# Patient Record
Sex: FEMALE | Race: WHITE | Employment: FULL TIME | URBAN - NONMETROPOLITAN AREA
[De-identification: names, ages, dates, MRNs, and addresses within clinical notes are randomized per-mention and may not be internally consistent; named-entity substitution may affect disease eponyms.]

---

## 2017-02-13 ENCOUNTER — OFFICE VISIT (OUTPATIENT)
Dept: FAMILY MEDICINE CLINIC | Age: 27
End: 2017-02-13

## 2017-02-13 VITALS
DIASTOLIC BLOOD PRESSURE: 60 MMHG | HEIGHT: 62 IN | WEIGHT: 111 LBS | SYSTOLIC BLOOD PRESSURE: 108 MMHG | BODY MASS INDEX: 20.43 KG/M2 | OXYGEN SATURATION: 97 % | HEART RATE: 80 BPM

## 2017-02-13 DIAGNOSIS — Z00.00 ROUTINE MEDICAL EXAM: Primary | ICD-10-CM

## 2017-02-13 DIAGNOSIS — L30.9 DERMATITIS: ICD-10-CM

## 2017-02-13 PROCEDURE — 99395 PREV VISIT EST AGE 18-39: CPT | Performed by: FAMILY MEDICINE

## 2017-02-13 ASSESSMENT — ENCOUNTER SYMPTOMS
DIARRHEA: 0
BLOOD IN STOOL: 0
WHEEZING: 0
CONSTIPATION: 1
VOMITING: 0
SHORTNESS OF BREATH: 0
NAUSEA: 0

## 2017-09-12 ENCOUNTER — OFFICE VISIT (OUTPATIENT)
Dept: PRIMARY CARE CLINIC | Age: 27
End: 2017-09-12
Payer: COMMERCIAL

## 2017-09-12 VITALS
DIASTOLIC BLOOD PRESSURE: 74 MMHG | BODY MASS INDEX: 21.65 KG/M2 | TEMPERATURE: 98.3 F | HEIGHT: 63 IN | SYSTOLIC BLOOD PRESSURE: 112 MMHG | HEART RATE: 74 BPM | WEIGHT: 122.2 LBS | OXYGEN SATURATION: 98 %

## 2017-09-12 DIAGNOSIS — J01.01 ACUTE RECURRENT MAXILLARY SINUSITIS: Primary | ICD-10-CM

## 2017-09-12 PROCEDURE — 99213 OFFICE O/P EST LOW 20 MIN: CPT | Performed by: FAMILY MEDICINE

## 2017-09-12 RX ORDER — AMOXICILLIN AND CLAVULANATE POTASSIUM 875; 125 MG/1; MG/1
1 TABLET, FILM COATED ORAL 2 TIMES DAILY
Qty: 20 TABLET | Refills: 0 | Status: SHIPPED | OUTPATIENT
Start: 2017-09-12 | End: 2017-09-22

## 2017-09-12 ASSESSMENT — ENCOUNTER SYMPTOMS
SORE THROAT: 0
DIARRHEA: 0
WHEEZING: 0
NAUSEA: 0
SINUS PRESSURE: 1
VOMITING: 0
RHINORRHEA: 1
SHORTNESS OF BREATH: 0
SINUS COMPLAINT: 1
COUGH: 0

## 2017-09-13 ENCOUNTER — PATIENT MESSAGE (OUTPATIENT)
Dept: PRIMARY CARE CLINIC | Age: 27
End: 2017-09-13

## 2017-09-13 DIAGNOSIS — J01.01 ACUTE RECURRENT MAXILLARY SINUSITIS: ICD-10-CM

## 2017-09-13 DIAGNOSIS — J01.01 ACUTE RECURRENT MAXILLARY SINUSITIS: Primary | ICD-10-CM

## 2017-09-13 RX ORDER — AMOXICILLIN AND CLAVULANATE POTASSIUM 875; 125 MG/1; MG/1
1 TABLET, FILM COATED ORAL 2 TIMES DAILY
Qty: 20 TABLET | Refills: 0 | Status: CANCELLED | OUTPATIENT
Start: 2017-09-13 | End: 2017-09-23

## 2017-09-13 RX ORDER — PREDNISONE 20 MG/1
40 TABLET ORAL DAILY
Qty: 10 TABLET | Refills: 0 | Status: SHIPPED | OUTPATIENT
Start: 2017-09-13 | End: 2017-09-18

## 2018-02-19 ENCOUNTER — OFFICE VISIT (OUTPATIENT)
Dept: FAMILY MEDICINE CLINIC | Age: 28
End: 2018-02-19
Payer: COMMERCIAL

## 2018-02-19 VITALS
WEIGHT: 127 LBS | BODY MASS INDEX: 22.5 KG/M2 | HEART RATE: 80 BPM | SYSTOLIC BLOOD PRESSURE: 100 MMHG | HEIGHT: 63 IN | OXYGEN SATURATION: 98 % | DIASTOLIC BLOOD PRESSURE: 60 MMHG

## 2018-02-19 DIAGNOSIS — Z00.00 ROUTINE MEDICAL EXAM: Primary | ICD-10-CM

## 2018-02-19 DIAGNOSIS — L30.9 DERMATITIS: ICD-10-CM

## 2018-02-19 PROCEDURE — 99395 PREV VISIT EST AGE 18-39: CPT | Performed by: FAMILY MEDICINE

## 2018-02-19 RX ORDER — MECLIZINE HYDROCHLORIDE 25 MG/1
25 TABLET ORAL 3 TIMES DAILY PRN
COMMUNITY

## 2018-02-19 RX ORDER — ACETAMINOPHEN 500 MG
500 TABLET ORAL EVERY 6 HOURS PRN
COMMUNITY
End: 2018-02-19 | Stop reason: ALTCHOICE

## 2018-02-19 ASSESSMENT — PATIENT HEALTH QUESTIONNAIRE - PHQ9
SUM OF ALL RESPONSES TO PHQ9 QUESTIONS 1 & 2: 0
SUM OF ALL RESPONSES TO PHQ QUESTIONS 1-9: 0
1. LITTLE INTEREST OR PLEASURE IN DOING THINGS: 0
2. FEELING DOWN, DEPRESSED OR HOPELESS: 0

## 2018-02-19 ASSESSMENT — ENCOUNTER SYMPTOMS: NAUSEA: 1

## 2018-07-09 ENCOUNTER — HOSPITAL ENCOUNTER (OUTPATIENT)
Age: 28
Setting detail: SPECIMEN
Discharge: HOME | End: 2018-07-09
Payer: COMMERCIAL

## 2018-07-09 DIAGNOSIS — R30.0 DYSURIA: Primary | ICD-10-CM

## 2018-07-09 DIAGNOSIS — R30.0 DYSURIA: ICD-10-CM

## 2018-07-09 LAB
-: ABNORMAL
AMORPHOUS: ABNORMAL
BACTERIA: ABNORMAL
BILIRUBIN URINE: NEGATIVE
CASTS UA: ABNORMAL /LPF (ref 0–2)
COLOR: ABNORMAL
COMMENT UA: ABNORMAL
CRYSTALS, UA: ABNORMAL /HPF
EPITHELIAL CELLS UA: ABNORMAL /HPF (ref 0–5)
GLUCOSE URINE: NEGATIVE
KETONES, URINE: NEGATIVE
LEUKOCYTE ESTERASE, URINE: NEGATIVE
MUCUS: ABNORMAL
NITRITE, URINE: NEGATIVE
OTHER OBSERVATIONS UA: ABNORMAL
PH UA: 6.5 (ref 5–6)
PROTEIN UA: ABNORMAL
RBC UA: ABNORMAL /HPF (ref 0–4)
RENAL EPITHELIAL, UA: ABNORMAL /HPF
SPECIFIC GRAVITY UA: 1.02 (ref 1.01–1.02)
TRICHOMONAS: ABNORMAL
TURBIDITY: ABNORMAL
URINE HGB: ABNORMAL
UROBILINOGEN, URINE: NORMAL
WBC UA: ABNORMAL /HPF (ref 0–4)
YEAST: ABNORMAL

## 2018-07-09 PROCEDURE — 81001 URINALYSIS AUTO W/SCOPE: CPT

## 2018-07-10 DIAGNOSIS — N30.01 ACUTE CYSTITIS WITH HEMATURIA: Primary | ICD-10-CM

## 2018-07-10 RX ORDER — NITROFURANTOIN 25; 75 MG/1; MG/1
100 CAPSULE ORAL 2 TIMES DAILY
Qty: 10 CAPSULE | Refills: 0 | Status: SHIPPED | OUTPATIENT
Start: 2018-07-10 | End: 2018-07-15

## 2018-07-16 ENCOUNTER — PATIENT MESSAGE (OUTPATIENT)
Dept: FAMILY MEDICINE CLINIC | Age: 28
End: 2018-07-16

## 2018-07-17 ENCOUNTER — OFFICE VISIT (OUTPATIENT)
Dept: FAMILY MEDICINE CLINIC | Age: 28
End: 2018-07-17
Payer: COMMERCIAL

## 2018-07-17 VITALS
SYSTOLIC BLOOD PRESSURE: 114 MMHG | WEIGHT: 133 LBS | RESPIRATION RATE: 16 BRPM | HEART RATE: 88 BPM | BODY MASS INDEX: 23.57 KG/M2 | DIASTOLIC BLOOD PRESSURE: 76 MMHG | HEIGHT: 63 IN

## 2018-07-17 DIAGNOSIS — H54.7 VISUAL PROBLEMS: ICD-10-CM

## 2018-07-17 DIAGNOSIS — O16.9 HYPERTENSION IN PREGNANCY, DELIVERED: Primary | ICD-10-CM

## 2018-07-17 PROCEDURE — 99214 OFFICE O/P EST MOD 30 MIN: CPT | Performed by: FAMILY MEDICINE

## 2018-07-17 PROCEDURE — G8420 CALC BMI NORM PARAMETERS: HCPCS | Performed by: FAMILY MEDICINE

## 2018-07-17 PROCEDURE — G8427 DOCREV CUR MEDS BY ELIG CLIN: HCPCS | Performed by: FAMILY MEDICINE

## 2018-07-17 PROCEDURE — 1036F TOBACCO NON-USER: CPT | Performed by: FAMILY MEDICINE

## 2018-07-18 ASSESSMENT — ENCOUNTER SYMPTOMS: NAUSEA: 0

## 2018-07-23 ENCOUNTER — PATIENT MESSAGE (OUTPATIENT)
Dept: FAMILY MEDICINE CLINIC | Age: 28
End: 2018-07-23

## 2018-07-23 NOTE — TELEPHONE ENCOUNTER
From: Erso Bear  To: Leigha Borges DO  Sent: 7/23/2018 9:51 AM EDT  Subject: Visit Follow-Up Question    Hello. Just wondering if and who i am supposed to call on the eye exam or are they going to call me? I did not hear anything last week. My symptoms are still the same with the double vision . I think if we could get that corrected Id be feeling pretty back to my normal self tho.   Thanks

## 2018-07-24 ENCOUNTER — PATIENT MESSAGE (OUTPATIENT)
Dept: FAMILY MEDICINE CLINIC | Age: 28
End: 2018-07-24

## 2018-07-24 ENCOUNTER — TELEPHONE (OUTPATIENT)
Dept: FAMILY MEDICINE CLINIC | Age: 28
End: 2018-07-24

## 2018-07-24 VITALS — SYSTOLIC BLOOD PRESSURE: 112 MMHG | DIASTOLIC BLOOD PRESSURE: 72 MMHG

## 2018-07-24 NOTE — TELEPHONE ENCOUNTER
From: Rachel Saha  To: Jeaneth Hurley DO  Sent: 7/24/2018 7:23 AM EDT  Subject: Visit Follow-Up Question    Okay thanks! I also think I wanna try switching Ana Nipple formula again maybe to a sensitive? Whats ur thought or suggestions? He just doesnt seem like a happy normal baby he will be awake for hours at a time just squirming fussing and grunting. Yesterday he was awake from 11am to 7 pm, fighting to be comfortable and his sleep. Then when he does fall asleep he only sleeps for about 30 mins and hes up fussing. I think its gas and we can always hear his tummy rumble. Im not sure what else to try we always make sure he burps after feedings. ----- Message -----  From: Jeaneth Hurley DO  Sent: 7/23/2018 7:13 PM EDT  To: Rachel Saha  Subject: RE: Visit Follow-Up Question    Osmar Galvan,    Let me look into a few things this evening, and I\"ll let you know tomorrow about the eye doctor referral. I want to review your records that we received from [facility], so I know exactly what to tell them. Thanks,   [facility]        ----- Message -----   From: Rachel Saha   Sent: 7/23/2018 9:51 AM EDT   To: Jeaneth Hurley DO  Subject: Visit Follow-Up Question    Hello. Just wondering if and who i am supposed to call on the eye exam or are they going to call me? I did not hear anything last week. My symptoms are still the same with the double vision . I think if we could get that corrected Id be feeling pretty back to my normal self tho.   Thanks

## 2018-07-26 ENCOUNTER — PATIENT MESSAGE (OUTPATIENT)
Dept: FAMILY MEDICINE CLINIC | Age: 28
End: 2018-07-26

## 2018-07-27 ENCOUNTER — OFFICE VISIT (OUTPATIENT)
Dept: FAMILY MEDICINE CLINIC | Age: 28
End: 2018-07-27
Payer: COMMERCIAL

## 2018-07-27 ENCOUNTER — OFFICE VISIT (OUTPATIENT)
Dept: OPHTHALMOLOGY | Age: 28
End: 2018-07-27
Payer: COMMERCIAL

## 2018-07-27 ENCOUNTER — HOSPITAL ENCOUNTER (OUTPATIENT)
Dept: LAB | Age: 28
Setting detail: SPECIMEN
Discharge: HOME | End: 2018-07-27
Payer: COMMERCIAL

## 2018-07-27 VITALS
DIASTOLIC BLOOD PRESSURE: 70 MMHG | BODY MASS INDEX: 23.21 KG/M2 | WEIGHT: 131 LBS | SYSTOLIC BLOOD PRESSURE: 104 MMHG | HEART RATE: 84 BPM

## 2018-07-27 DIAGNOSIS — E83.41 HYPERMAGNESEMIA: ICD-10-CM

## 2018-07-27 DIAGNOSIS — I67.83 PRES (POSTERIOR REVERSIBLE ENCEPHALOPATHY SYNDROME): ICD-10-CM

## 2018-07-27 DIAGNOSIS — H53.2 DIPLOPIA: ICD-10-CM

## 2018-07-27 DIAGNOSIS — H50.00 ESOTROPIA: Primary | ICD-10-CM

## 2018-07-27 LAB
ABSOLUTE EOS #: 0.1 K/UL (ref 0–0.4)
ABSOLUTE IMMATURE GRANULOCYTE: ABNORMAL K/UL (ref 0–0.3)
ABSOLUTE LYMPH #: 2.6 K/UL (ref 1–4.8)
ABSOLUTE MONO #: 0.5 K/UL (ref 0.1–1.2)
ALBUMIN SERPL-MCNC: 4.2 G/DL (ref 3.5–5.2)
ALBUMIN/GLOBULIN RATIO: 1.3 (ref 1–2.5)
ALP BLD-CCNC: 77 U/L (ref 35–104)
ALT SERPL-CCNC: 18 U/L (ref 5–33)
ANION GAP SERPL CALCULATED.3IONS-SCNC: 11 MMOL/L (ref 9–17)
AST SERPL-CCNC: 18 U/L
BASOPHILS # BLD: 1 % (ref 0–1)
BASOPHILS ABSOLUTE: 0.1 K/UL (ref 0–0.2)
BILIRUB SERPL-MCNC: 0.3 MG/DL (ref 0.3–1.2)
BUN BLDV-MCNC: 13 MG/DL (ref 6–20)
BUN/CREAT BLD: 18 (ref 9–20)
CALCIUM SERPL-MCNC: 9.4 MG/DL (ref 8.6–10.4)
CHLORIDE BLD-SCNC: 102 MMOL/L (ref 98–107)
CO2: 29 MMOL/L (ref 20–31)
CREAT SERPL-MCNC: 0.72 MG/DL (ref 0.5–0.9)
DIFFERENTIAL TYPE: ABNORMAL
EOSINOPHILS RELATIVE PERCENT: 2 % (ref 1–7)
GFR AFRICAN AMERICAN: >60 ML/MIN
GFR NON-AFRICAN AMERICAN: >60 ML/MIN
GFR SERPL CREATININE-BSD FRML MDRD: NORMAL ML/MIN/{1.73_M2}
GFR SERPL CREATININE-BSD FRML MDRD: NORMAL ML/MIN/{1.73_M2}
GLUCOSE BLD-MCNC: 97 MG/DL (ref 70–99)
HCT VFR BLD CALC: 41.6 % (ref 36–46)
HEMOGLOBIN: 13.8 G/DL (ref 12–16)
IMMATURE GRANULOCYTES: ABNORMAL %
LYMPHOCYTES # BLD: 31 % (ref 16–46)
MAGNESIUM: 2.1 MG/DL (ref 1.6–2.6)
MCH RBC QN AUTO: 29.9 PG (ref 26–34)
MCHC RBC AUTO-ENTMCNC: 33.2 G/DL (ref 31–37)
MCV RBC AUTO: 90.1 FL (ref 80–100)
MONOCYTES # BLD: 7 % (ref 4–11)
NRBC AUTOMATED: ABNORMAL PER 100 WBC
PDW BLD-RTO: 14.6 % (ref 11–14.5)
PLATELET # BLD: 311 K/UL (ref 140–450)
PLATELET ESTIMATE: ABNORMAL
PMV BLD AUTO: 7.7 FL (ref 6–12)
POTASSIUM SERPL-SCNC: 3.8 MMOL/L (ref 3.7–5.3)
RBC # BLD: 4.61 M/UL (ref 4–5.2)
RBC # BLD: ABNORMAL 10*6/UL
SEG NEUTROPHILS: 59 % (ref 43–77)
SEGMENTED NEUTROPHILS ABSOLUTE COUNT: 5.1 K/UL (ref 1.8–7.7)
SODIUM BLD-SCNC: 142 MMOL/L (ref 135–144)
TOTAL PROTEIN: 7.4 G/DL (ref 6.4–8.3)
WBC # BLD: 8.5 K/UL (ref 3.5–11)
WBC # BLD: ABNORMAL 10*3/UL

## 2018-07-27 PROCEDURE — 80053 COMPREHEN METABOLIC PANEL: CPT

## 2018-07-27 PROCEDURE — G8427 DOCREV CUR MEDS BY ELIG CLIN: HCPCS | Performed by: OPHTHALMOLOGY

## 2018-07-27 PROCEDURE — 99214 OFFICE O/P EST MOD 30 MIN: CPT | Performed by: FAMILY MEDICINE

## 2018-07-27 PROCEDURE — 83735 ASSAY OF MAGNESIUM: CPT

## 2018-07-27 PROCEDURE — G8427 DOCREV CUR MEDS BY ELIG CLIN: HCPCS | Performed by: FAMILY MEDICINE

## 2018-07-27 PROCEDURE — 36415 COLL VENOUS BLD VENIPUNCTURE: CPT

## 2018-07-27 PROCEDURE — 85025 COMPLETE CBC W/AUTO DIFF WBC: CPT

## 2018-07-27 PROCEDURE — 1036F TOBACCO NON-USER: CPT | Performed by: FAMILY MEDICINE

## 2018-07-27 PROCEDURE — 1036F TOBACCO NON-USER: CPT | Performed by: OPHTHALMOLOGY

## 2018-07-27 PROCEDURE — G8420 CALC BMI NORM PARAMETERS: HCPCS | Performed by: OPHTHALMOLOGY

## 2018-07-27 PROCEDURE — 99214 OFFICE O/P EST MOD 30 MIN: CPT | Performed by: OPHTHALMOLOGY

## 2018-07-27 PROCEDURE — G8420 CALC BMI NORM PARAMETERS: HCPCS | Performed by: FAMILY MEDICINE

## 2018-07-27 ASSESSMENT — TONOMETRY
OD_IOP_MMHG: 16
IOP_METHOD: PNEUMO-TONOMETER
OS_IOP_MMHG: 14

## 2018-07-27 ASSESSMENT — VISUAL ACUITY
OD_PH_SC: 20/20-2
METHOD: SNELLEN - LINEAR
OD_SC: 20/50
OS_SC: 20/40
OS_PH_SC: 20/20

## 2018-07-27 ASSESSMENT — SLIT LAMP EXAM - LIDS
COMMENTS: MILD BLEPHARITIS. MILD MGD
COMMENTS: MILD BLEPHARITIS. MILD MGD

## 2018-07-27 NOTE — PROGRESS NOTES
Jul 2018 through 04 Jul 2018 and had induced labor. 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 In the  clinic, pt measured 12 PD of Esotropia at distance with no deviation at near. Ocular motility was  normal OU. Suspect that Cerebral Edema may have affect CN 6. D/W pt and pt's primary care  provider that pt should be evaluated by a Neurologist to consider whether neuroimaging of the  brainstem may be warranted. Advised pt that she may consider wearing a patch over one eye  in the interim. Counseled pt that this condition may improve with time. Advised pt to be evaluated  by Optometry to determine whether Fresnel prism may be warranted. Follow.       Electronically signed by Heavenly Tripp MD on 7/27/2018 at 4:54 PM

## 2018-07-27 NOTE — PROGRESS NOTES
[facility]  [address], [address] [place]  [phone]      Debbie Hobson is a 33 y.o. female who presents today for her medical conditions/complaints as noted below. Debbie Hobson is c/o of Hypertension      HPI:     Pt here today for follow-up of BP. BP well-controlled today - 104/70; was 112/72 on  when here for BP check. Has been 100/70 on her home cuff as well, which is more typical for what she was pre-pregnancy. Has not taken any Labetalol in the past 2 weeks. Still having side by side double vision - unchanged since last week. Has slight headache by the end of the day due to eye strain. MRI of brain performed on 18 at [facility] reviewed - see scanned results. Findings of edema noted, consistent with PRES syndrome. Taking daily multi-vitamin.         Past Medical History:   Diagnosis Date    Constipation     history of    Fracture     history of left distal radial buckle fracture    Tonsil stone     right, history of      Past Surgical History:   Procedure Laterality Date     SECTION       Family History   Problem Relation Age of Onset    Diabetes Father     High Blood Pressure Father     Diabetes Paternal Grandmother     Cancer Maternal Grandmother         lung    High Blood Pressure Paternal Grandfather     Cancer Paternal Grandfather         throat    Hypertension Maternal Grandmother     Cancer Maternal Grandfather     Diabetes Other         aunt    Other Other         pyloric stenosis in sibling     Social History   Substance Use Topics    Smoking status: Never Smoker    Smokeless tobacco: Never Used    Alcohol use No      Current Outpatient Prescriptions   Medication Sig Dispense Refill    Prenatal MV-Min-Fe Fum-FA-DHA (PRENATAL 1 PO) Take 1 tablet by mouth daily      meclizine (ANTIVERT) 25 MG tablet Take 25 mg by mouth 3 times daily as needed for Dizziness       No current facility-administered today - CBC, CMP, magnesium; have not been re-checked since she was discharged home. Return if symptoms worsen or fail to improve. Orders Placed This Encounter   Procedures    CBC Auto Differential     Standing Status:   Future     Number of Occurrences:   1     Standing Expiration Date:   7/27/2019    Comprehensive Metabolic Panel     Standing Status:   Future     Number of Occurrences:   1     Standing Expiration Date:   7/27/2019    Magnesium     Standing Status:   Future     Number of Occurrences:   1     Standing Expiration Date:   7/27/2019    External Referral To Neurology     Referral Priority:   Routine     Referral Type:   Eval and Treat     Referral Reason:   Specialty Services Required     Requested Specialty:   Neurology     Number of Visits Requested:   1       All patient questions answered. Pt voiced understanding.           Electronically signed by Haseeb Lanier DO on 7/30/2018 at 9:41 AM

## 2018-08-01 ENCOUNTER — PATIENT MESSAGE (OUTPATIENT)
Dept: FAMILY MEDICINE CLINIC | Age: 28
End: 2018-08-01

## 2018-08-01 DIAGNOSIS — I67.83 POSTERIOR REVERSIBLE ENCEPHALOPATHY SYNDROME: Primary | ICD-10-CM

## 2018-08-06 ENCOUNTER — PATIENT MESSAGE (OUTPATIENT)
Dept: FAMILY MEDICINE CLINIC | Age: 28
End: 2018-08-06

## 2018-08-06 NOTE — TELEPHONE ENCOUNTER
From: Genesis Carrera  To: Tamir Grande DO  Sent: 8/6/2018 12:08 PM EDT  Subject: Visit Lee Woods,   Dr Rufino Alvares wanted me to wait 8 weeks for an appointment so we decided to go with the neurologist you recommend here in [place] and I will see him this Wednesday. I have also set up an appointment with the optometrist Calos Montoya since Dr Castillo Salinas was not in our eye insurance network to get my eyes checked and talk about the temperay prism glasses. So hopefully we get some answers or a solution of some kind. If I need to be off work longer than the 8 weeks maternity due to these vision issue which doctor would I have to have my work send papers to ? Thanks for all your help!  ----- Message -----  From: Aide Galan  Sent: 8/1/2018 3:48 PM EDT  To: Genesis Carrera  Subject: RE: Visit Follow-Up Question  Mandy,    Call Dr. Rufino Alvares' office to schedule an appt. Hope you have a great day!    ----- Message -----   From: Genesis Carrera   Sent: 8/1/2018 11:55 AM EDT   To: Tamir Grande DO  Subject: Visit Follow-Up Question    Holly. I know dr Sally Thacker is out of the office this week but maybe you could answer some of my questions. So the more my  and I talked about it we think it would be best to get back with the same neurologist that seen me that week in [facility] since he already knew what took place [facility] and have to start all over with this mess. His name is Dr Pedro Monge. Would that cause too much confusion to see him instead and how do I go about that? And also Dr Sally Thacker did not specify how soon should I see the optometrist. Im not sure if I should wait a few weeks or go right away?  Unfortunately Dr Castillo Salinas at the clinic isnt covered by my eye insurance so Im going to have to find another eye

## 2018-08-09 ENCOUNTER — PATIENT MESSAGE (OUTPATIENT)
Dept: FAMILY MEDICINE CLINIC | Age: 28
End: 2018-08-09

## 2018-08-15 ENCOUNTER — PATIENT MESSAGE (OUTPATIENT)
Dept: FAMILY MEDICINE CLINIC | Age: 28
End: 2018-08-15

## 2018-08-15 NOTE — TELEPHONE ENCOUNTER
From: Jodee Bernheim  To: Letty Nieto DO  Sent: 8/15/2018 10:25 AM EDT  Subject: Visit Dr kimberly Tolentino was very nice and we ordered some glasses to give them a try. 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 So Im going to cancel the eeg I have set up with [facility] unless you think its a good idea or going to find anything? If I need to see a neurologist I would rather go with Kevin Marsh from [place] he seemed to know a little more. Should I set up an appointment with him? Also do you think I could please get you to fill out the papers for short term disability leave to get adjusted to the glasses? It was my boss Nadias idea since my paid maternity leave is up on the 24th and Im still not back to doing normal stuff yet? If not I understand   Thanks  ----- Message -----  From: Letty Nieto DO  Sent: 8/10/2018 2:07 PM EDT  To: Jodee Bernheim  Subject: RE: Visit Follow-Up Question    I'm so sorry that the Neurologist did not have any of your records, because we did send them over last week. That is super frustrating, especially for you to have to go over everything from the beginning. I'm not sure that the EEG will show anything, as I am not concerned about seizure activity with your symptoms. Typically, they do not check for much else other than that. I spoke with Dr. Krishan Morgan today, and will be sending your office visit note from Dr. Duc Allison over there today so that he has that for Monday. He will be great to talk with about a prism, as he does a lot of that, and he does think he'll be able to help you pretty quickly with the double vision. Let's wait and see what he says more before we decide about anything else with Neuro. We can always get a second opinion, if you want, after the eye appt.     Also, I am so sorry but I want to make sure I have down what we talked about the other night in Urgent Care for Lawrence's next appt - didn't we talk about 8/27

## 2018-08-16 ENCOUNTER — PATIENT MESSAGE (OUTPATIENT)
Dept: FAMILY MEDICINE CLINIC | Age: 28
End: 2018-08-16

## 2018-08-20 ENCOUNTER — OFFICE VISIT (OUTPATIENT)
Dept: FAMILY MEDICINE CLINIC | Age: 28
End: 2018-08-20
Payer: COMMERCIAL

## 2018-08-20 VITALS — BODY MASS INDEX: 24.45 KG/M2 | SYSTOLIC BLOOD PRESSURE: 126 MMHG | DIASTOLIC BLOOD PRESSURE: 74 MMHG | WEIGHT: 138 LBS

## 2018-08-20 DIAGNOSIS — F43.23 SITUATIONAL MIXED ANXIETY AND DEPRESSIVE DISORDER: Primary | ICD-10-CM

## 2018-08-20 PROCEDURE — G8427 DOCREV CUR MEDS BY ELIG CLIN: HCPCS | Performed by: FAMILY MEDICINE

## 2018-08-20 PROCEDURE — 1036F TOBACCO NON-USER: CPT | Performed by: FAMILY MEDICINE

## 2018-08-20 PROCEDURE — 99214 OFFICE O/P EST MOD 30 MIN: CPT | Performed by: FAMILY MEDICINE

## 2018-08-20 PROCEDURE — G8420 CALC BMI NORM PARAMETERS: HCPCS | Performed by: FAMILY MEDICINE

## 2018-08-20 PROCEDURE — G0444 DEPRESSION SCREEN ANNUAL: HCPCS | Performed by: FAMILY MEDICINE

## 2018-08-20 RX ORDER — ESCITALOPRAM OXALATE 5 MG/1
5 TABLET ORAL DAILY
Qty: 30 TABLET | Refills: 0 | Status: SHIPPED | OUTPATIENT
Start: 2018-08-20 | End: 2019-08-28

## 2018-08-20 ASSESSMENT — PATIENT HEALTH QUESTIONNAIRE - PHQ9
8. MOVING OR SPEAKING SO SLOWLY THAT OTHER PEOPLE COULD HAVE NOTICED. OR THE OPPOSITE, BEING SO FIGETY OR RESTLESS THAT YOU HAVE BEEN MOVING AROUND A LOT MORE THAN USUAL: 0
7. TROUBLE CONCENTRATING ON THINGS, SUCH AS READING THE NEWSPAPER OR WATCHING TELEVISION: 3
SUM OF ALL RESPONSES TO PHQ QUESTIONS 1-9: 10
SUM OF ALL RESPONSES TO PHQ QUESTIONS 1-9: 10
6. FEELING BAD ABOUT YOURSELF - OR THAT YOU ARE A FAILURE OR HAVE LET YOURSELF OR YOUR FAMILY DOWN: 0
3. TROUBLE FALLING OR STAYING ASLEEP: 2
5. POOR APPETITE OR OVEREATING: 0
10. IF YOU CHECKED OFF ANY PROBLEMS, HOW DIFFICULT HAVE THESE PROBLEMS MADE IT FOR YOU TO DO YOUR WORK, TAKE CARE OF THINGS AT HOME, OR GET ALONG WITH OTHER PEOPLE: 1
9. THOUGHTS THAT YOU WOULD BE BETTER OFF DEAD, OR OF HURTING YOURSELF: 0
4. FEELING TIRED OR HAVING LITTLE ENERGY: 1
SUM OF ALL RESPONSES TO PHQ9 QUESTIONS 1 & 2: 4
2. FEELING DOWN, DEPRESSED OR HOPELESS: 2
1. LITTLE INTEREST OR PLEASURE IN DOING THINGS: 2

## 2018-08-20 NOTE — PROGRESS NOTES
[facility]  [address], [address] [place]  [phone]      Angélica Heard is a 33 y.o. female who presents today for her medical conditions/complaints as noted below. Angélica Heard is c/o of Depression      HPI:     Pt here today for depression/anxiety. Sometimes feels hopeless about whether her double vision will improve. Has felt overwhelmed with double vision and how irritable her son has been. Not sleeping well due to son not sleeping well. Denies SI. Has never had h/o depression or anxiety before; never taken any meds before. Past Medical History:   Diagnosis Date    Constipation     history of    Fracture     history of left distal radial buckle fracture    Tonsil stone     right, history of      Past Surgical History:   Procedure Laterality Date     SECTION       Family History   Problem Relation Age of Onset    Diabetes Father     High Blood Pressure Father     Diabetes Paternal Grandmother     Cancer Maternal Grandmother         lung    High Blood Pressure Paternal Grandfather     Cancer Paternal Grandfather         throat    Hypertension Maternal Grandmother     Cancer Maternal Grandfather     Diabetes Other         aunt    Other Other         pyloric stenosis in sibling     Social History   Substance Use Topics    Smoking status: Never Smoker    Smokeless tobacco: Never Used    Alcohol use No      Current Outpatient Prescriptions   Medication Sig Dispense Refill    escitalopram (LEXAPRO) 5 MG tablet Take 1 tablet by mouth daily 30 tablet 0    Prenatal MV-Min-Fe Fum-FA-DHA (PRENATAL 1 PO) Take 1 tablet by mouth daily      meclizine (ANTIVERT) 25 MG tablet Take 25 mg by mouth 3 times daily as needed for Dizziness       No current facility-administered medications for this visit.       Allergies   Allergen Reactions    Bactrim [Sulfamethoxazole-Trimethoprim] Rash       Health Maintenance   Topic Date Due   

## 2018-09-07 ENCOUNTER — PATIENT MESSAGE (OUTPATIENT)
Dept: FAMILY MEDICINE CLINIC | Age: 28
End: 2018-09-07

## 2018-12-31 ENCOUNTER — HOSPITAL ENCOUNTER (OUTPATIENT)
Age: 28
Setting detail: SPECIMEN
Discharge: HOME | End: 2018-12-31
Payer: COMMERCIAL

## 2018-12-31 DIAGNOSIS — Z87.59 HISTORY OF PRE-ECLAMPSIA: Primary | ICD-10-CM

## 2018-12-31 DIAGNOSIS — Z87.59 HISTORY OF PRE-ECLAMPSIA: ICD-10-CM

## 2018-12-31 LAB
-: ABNORMAL
AMORPHOUS: ABNORMAL
BACTERIA: ABNORMAL
BILIRUBIN URINE: NEGATIVE
CASTS UA: ABNORMAL /LPF (ref 0–2)
COLOR: NORMAL
COMMENT UA: NORMAL
CRYSTALS, UA: ABNORMAL /HPF
EPITHELIAL CELLS UA: ABNORMAL /HPF (ref 0–5)
GLUCOSE URINE: NEGATIVE
KETONES, URINE: NEGATIVE
LEUKOCYTE ESTERASE, URINE: NEGATIVE
MUCUS: ABNORMAL
NITRITE, URINE: NEGATIVE
OTHER OBSERVATIONS UA: ABNORMAL
PH UA: 6 (ref 5–6)
PROTEIN UA: NEGATIVE
RBC UA: ABNORMAL /HPF (ref 0–4)
RENAL EPITHELIAL, UA: ABNORMAL /HPF
SPECIFIC GRAVITY UA: 1.02 (ref 1.01–1.02)
TRICHOMONAS: ABNORMAL
TURBIDITY: NORMAL
URINE HGB: NEGATIVE
UROBILINOGEN, URINE: NORMAL
WBC UA: ABNORMAL /HPF (ref 0–4)
YEAST: ABNORMAL

## 2018-12-31 PROCEDURE — 81001 URINALYSIS AUTO W/SCOPE: CPT

## 2019-08-28 ENCOUNTER — OFFICE VISIT (OUTPATIENT)
Dept: PRIMARY CARE CLINIC | Age: 29
End: 2019-08-28
Payer: COMMERCIAL

## 2019-08-28 VITALS
SYSTOLIC BLOOD PRESSURE: 116 MMHG | BODY MASS INDEX: 23.03 KG/M2 | HEART RATE: 84 BPM | OXYGEN SATURATION: 99 % | TEMPERATURE: 98.8 F | WEIGHT: 130 LBS | DIASTOLIC BLOOD PRESSURE: 68 MMHG

## 2019-08-28 DIAGNOSIS — J06.9 UPPER RESPIRATORY TRACT INFECTION, UNSPECIFIED TYPE: Primary | ICD-10-CM

## 2019-08-28 PROCEDURE — 99213 OFFICE O/P EST LOW 20 MIN: CPT | Performed by: FAMILY MEDICINE

## 2019-08-28 NOTE — PROGRESS NOTES
[facility]             [address] [place] [address], [address]                        Telephone [phone]             Fax [phone]       Magda Hood  1990  MRN:  [id number]  Date of visit:  8/28/2019     Subjective:    Magda Hood is a 33 y.o.  female who presents to [facility] today (8/28/2019) for evaluation of:  Otalgia (bilat )      She states that she has had upper respiratory symptoms for the past few days. She has tried Sudafed as well as allergy medication. She denies fever. She reports some ear pain. Her son is here today and he was diagnosed with otitis media. Current medications are:  Current Outpatient Medications   Medication Sig Dispense Refill    meclizine (ANTIVERT) 25 MG tablet Take 25 mg by mouth 3 times daily as needed for Dizziness       No current facility-administered medications for this visit. She is allergic to bactrim [sulfamethoxazole-trimethoprim]. She has the following problem list:  Patient Active Problem List   Diagnosis    Esotropia        She  reports that she has never smoked. She has never used smokeless tobacco.      Objective:    Vitals:    08/28/19 1440   BP: 116/68   Site: Left Upper Arm   Position: Sitting   Cuff Size: Medium Adult   Pulse: 84   Temp: 98.8 °F (37.1 °C)   TempSrc: Tympanic   SpO2: 99%   Weight: 130 lb (59 kg)      SpO2: 99 %       Body mass index is 23.03 kg/m². Well-nourished, well-developed  female healthy-appearing, alert and cooperative. The tympanic membranes are clear bilaterally. Oropharynx has no erythema. There is no exudate. There is no tenderness over the frontal and maxillary sinuses bilaterally. Neck supple. No adenopathy. Chest:  Normal expansion. Clear to auscultation. No rales, rhonchi, or wheezing. Respirations are not labored.    Heart sounds are normal.  Regular rate and rhythm without murmur, gallop or rub. Assessment & Plan:    Upper respiratory tract infection, unspecified type  Probably viral.  Symptomatic treatment was recommended. She was advised to follow up if symptoms worsen or do not resolve.          (Please note that portions of this note were completed with a voice-recognition program. Efforts were made to edit the dictation but occasionally words are mis-transcribed.)

## 2019-08-30 ENCOUNTER — TELEPHONE (OUTPATIENT)
Dept: FAMILY MEDICINE CLINIC | Age: 29
End: 2019-08-30

## 2019-08-30 DIAGNOSIS — R42 DIZZINESS: Primary | ICD-10-CM

## 2019-08-30 RX ORDER — PREDNISONE 20 MG/1
40 TABLET ORAL DAILY
Qty: 12 TABLET | Refills: 0 | Status: SHIPPED | OUTPATIENT
Start: 2019-08-30 | End: 2019-09-05

## 2019-09-06 ENCOUNTER — OFFICE VISIT (OUTPATIENT)
Dept: PRIMARY CARE CLINIC | Age: 29
End: 2019-09-06
Payer: COMMERCIAL

## 2019-09-06 VITALS
WEIGHT: 128.4 LBS | BODY MASS INDEX: 22.75 KG/M2 | DIASTOLIC BLOOD PRESSURE: 72 MMHG | HEART RATE: 75 BPM | HEIGHT: 63 IN | OXYGEN SATURATION: 98 % | SYSTOLIC BLOOD PRESSURE: 102 MMHG

## 2019-09-06 DIAGNOSIS — R42 VERTIGO: Primary | ICD-10-CM

## 2019-09-06 PROCEDURE — 99213 OFFICE O/P EST LOW 20 MIN: CPT | Performed by: NURSE PRACTITIONER

## 2019-09-06 RX ORDER — PREDNISONE 20 MG/1
40 TABLET ORAL DAILY
Qty: 14 TABLET | Refills: 0 | Status: SHIPPED | OUTPATIENT
Start: 2019-09-06 | End: 2019-09-13

## 2019-09-06 NOTE — PATIENT INSTRUCTIONS
example, call if:    · You passed out (lost consciousness).     · You have dizziness along with symptoms of a heart attack. These may include:  ? Chest pain or pressure, or a strange feeling in the chest.  ? Sweating. ? Shortness of breath. ? Nausea or vomiting. ? Pain, pressure, or a strange feeling in the back, neck, jaw, or upper belly or in one or both shoulders or arms. ? Lightheadedness or sudden weakness. ? A fast or irregular heartbeat.     · You have symptoms of a stroke. These may include:  ? Sudden numbness, tingling, weakness, or loss of movement in your face, arm, or leg, especially on only one side of your body. ? Sudden vision changes. ? Sudden trouble speaking. ? Sudden confusion or trouble understanding simple statements. ? Sudden problems with walking or balance. ? A sudden, severe headache that is different from past headaches.    Call your doctor now or seek immediate medical care if:    · You feel dizzy and have a fever, headache, or ringing in your ears.     · You have new or increased nausea and vomiting.     · Your dizziness does not go away or comes back.    Watch closely for changes in your health, and be sure to contact your doctor if:    · You do not get better as expected. Where can you learn more? Go to https://EdRover.CogniK. org and sign in to your MediaTrust account. Enter N027 in the [facility] box to learn more about \"Dizziness: Care Instructions. \"     If you do not have an account, please click on the \"Sign Up Now\" link. Current as of: September 23, 2018  Content Version: 12.1  © 6949-4160 Healthwise, Incorporated. Care instructions adapted under license by [facility] ([facility]). If you have questions about a medical condition or this instruction, always ask your healthcare professional. [place] [address] any warranty or liability for your use of this information.          Patient Education        [facility] for Vertigo: Information box to learn more about \"Vertigo: Care Instructions. \"     If you do not have an account, please click on the \"Sign Up Now\" link. Current as of: October 21, 2018  Content Version: 12.1  © 2713-4659 mymission2. Care instructions adapted under license by [facility] ([facility]). If you have questions about a medical condition or this instruction, always ask your healthcare professional. [address] any warranty or liability for your use of this information. Patient Education        Vertigo: Exercises  Introduction  Here are some examples of exercises for you to try. The exercises may be suggested for a condition or for rehabilitation. Start each exercise slowly. Ease off the exercises if you start to have pain. You will be told when to start these exercises and which ones will work best for you. How to do the exercises  Exercise 1    1. Stand with a chair in front of you and a wall behind you. If you begin to fall, you may use them for support. 2. Stand with your feet together and your arms at your sides. 3. Move your head up and down 10 times. Exercise 2    1. Move your head side to side 10 times. Exercise 3    1. Move your head diagonally up and down 10 times. Exercise 4    1. Move your head diagonally up and down 10 times on the other side. Follow-up care is a key part of your treatment and safety. Be sure to make and go to all appointments, and call your doctor if you are having problems. It's also a good idea to know your test results and keep a list of the medicines you take. Where can you learn more? Go to https://[email or web address].[email or web address] org and sign in to your Clinicbook account. Enter F349 in the [employer] box to learn more about \"Vertigo: Exercises. \"     If you do not have an account, please click on the \"Sign Up Now\" link. Current as of: October 21, 2018  Content Version: 12.1  © 8838-4716 mymission2.  Care instructions adapted under license by [facility] ([facility]). If you have questions about a medical condition or this instruction, always ask your healthcare professional. [address] any warranty or liability for your use of this information.

## 2019-11-23 NOTE — TELEPHONE ENCOUNTER
In the past, when pt has had dizziness with ear symptoms, we have treated with steroid, and she has done well. Would recommend we try this first, and if still not improved by early next week, can add antibiotic, based on symptoms she is still having. Will send in Rx for Prednisone 40 mg daily x 6 days to pharmacy now. Pt to continue with supportive care for URI symptoms as well. None

## 2023-12-29 ENCOUNTER — OFFICE VISIT (OUTPATIENT)
Dept: PRIMARY CARE CLINIC | Age: 33
End: 2023-12-29
Payer: COMMERCIAL

## 2023-12-29 VITALS
OXYGEN SATURATION: 97 % | WEIGHT: 150.2 LBS | HEART RATE: 108 BPM | DIASTOLIC BLOOD PRESSURE: 70 MMHG | TEMPERATURE: 98.9 F | BODY MASS INDEX: 26.61 KG/M2 | SYSTOLIC BLOOD PRESSURE: 100 MMHG

## 2023-12-29 DIAGNOSIS — R05.9 COUGH IN ADULT: Primary | ICD-10-CM

## 2023-12-29 DIAGNOSIS — J10.1 INFLUENZA B: ICD-10-CM

## 2023-12-29 LAB
INFLUENZA A ANTIBODY: ABNORMAL
INFLUENZA B ANTIBODY: ABNORMAL

## 2023-12-29 PROCEDURE — G8419 CALC BMI OUT NRM PARAM NOF/U: HCPCS

## 2023-12-29 PROCEDURE — 99213 OFFICE O/P EST LOW 20 MIN: CPT

## 2023-12-29 PROCEDURE — 1036F TOBACCO NON-USER: CPT

## 2023-12-29 PROCEDURE — 87804 INFLUENZA ASSAY W/OPTIC: CPT

## 2023-12-29 PROCEDURE — G8484 FLU IMMUNIZE NO ADMIN: HCPCS

## 2023-12-29 PROCEDURE — G8427 DOCREV CUR MEDS BY ELIG CLIN: HCPCS

## 2023-12-29 NOTE — PROGRESS NOTES
[facility]  [address]  [address] [address]  Dept: [phone]  Dept Fax: [phone]    Isai Carpenter  is a 29 y.o. female who presents today for her medical conditions/complaints as noted below. Isai Carpenter is c/o of   Chief Complaint   Patient presents with    URI     Cough sore throat due to coughing some nasal drip, chils was exposed to flu home covid neg       HPI:     URI   This is a new problem. The current episode started in the past 7 days. The problem has been gradually improving. There has been no fever. Associated symptoms include congestion, coughing, rhinorrhea and a sore throat. Pertinent negatives include no diarrhea, nausea, sinus pain, sneezing, vomiting or wheezing. She has tried acetaminophen and NSAIDs for the symptoms. The treatment provided mild relief. Refused Tamiflu, unsure of start of symptoms    Past Medical History:   Diagnosis Date    Constipation     history of    Fracture     history of left distal radial buckle fracture    Tonsil stone     right, history of     Past Surgical History:   Procedure Laterality Date     SECTION         Family History   Problem Relation Age of Onset    Diabetes Father     High Blood Pressure Father     Diabetes Paternal Grandmother     Cancer Maternal Grandmother         lung    High Blood Pressure Paternal Grandfather     Cancer Paternal Grandfather         throat    Hypertension Maternal Grandmother     Cancer Maternal Grandfather     Diabetes Other         aunt    Other Other         pyloric stenosis in sibling       Social History     Tobacco Use    Smoking status: Never    Smokeless tobacco: Never   Substance Use Topics    Alcohol use: No      Prior to Visit Medications    Medication Sig Taking?  Authorizing Provider   meclizine (ANTIVERT) 25 MG tablet Take 25 mg by mouth 3 times daily as